# Patient Record
Sex: FEMALE | Race: WHITE | NOT HISPANIC OR LATINO | URBAN - METROPOLITAN AREA
[De-identification: names, ages, dates, MRNs, and addresses within clinical notes are randomized per-mention and may not be internally consistent; named-entity substitution may affect disease eponyms.]

---

## 2017-08-11 ENCOUNTER — IMPORTED ENCOUNTER (OUTPATIENT)
Dept: URBAN - METROPOLITAN AREA CLINIC 50 | Facility: CLINIC | Age: 67
End: 2017-08-11

## 2017-08-14 ENCOUNTER — IMPORTED ENCOUNTER (OUTPATIENT)
Dept: URBAN - METROPOLITAN AREA CLINIC 50 | Facility: CLINIC | Age: 67
End: 2017-08-14

## 2018-06-11 ENCOUNTER — IMPORTED ENCOUNTER (OUTPATIENT)
Dept: URBAN - METROPOLITAN AREA CLINIC 50 | Facility: CLINIC | Age: 68
End: 2018-06-11

## 2018-08-24 ENCOUNTER — IMPORTED ENCOUNTER (OUTPATIENT)
Dept: URBAN - METROPOLITAN AREA CLINIC 50 | Facility: CLINIC | Age: 68
End: 2018-08-24

## 2019-08-22 ENCOUNTER — IMPORTED ENCOUNTER (OUTPATIENT)
Dept: URBAN - METROPOLITAN AREA CLINIC 50 | Facility: CLINIC | Age: 69
End: 2019-08-22

## 2020-08-27 ENCOUNTER — IMPORTED ENCOUNTER (OUTPATIENT)
Dept: URBAN - METROPOLITAN AREA CLINIC 50 | Facility: CLINIC | Age: 70
End: 2020-08-27

## 2020-08-27 NOTE — PATIENT DISCUSSION
"""Sympomatic PVD both eyes. Extended ophthalmoscopy performed today. No evidence of retinal tears seen on [facility] 3-mirror exam. RD precautions discussed. Patient instructed to call if vision decreases or RD warning signs increase/worsen.  """

## 2021-04-18 ASSESSMENT — VISUAL ACUITY
OS_CC: 20/25
OS_CC: J1+@ 17 IN
OD_CC: J1+
OS_CC: 20/25
OS_CC: 20/25
OD_CC: J1+@ 17 IN
OD_CC: 20/25
OD_CC: J1+
OS_CC: J1+
OD_CC: 20/25-1
OD_CC: 20/20-
OS_CC: 20/20-2
OS_CC: J1+
OD_CC: 20/20-1

## 2021-04-18 ASSESSMENT — TONOMETRY
OS_IOP_MMHG: 12
OD_IOP_MMHG: 11
OS_IOP_MMHG: 11
OS_IOP_MMHG: 12
OD_IOP_MMHG: 12
OD_IOP_MMHG: 11
OS_IOP_MMHG: 11
OD_IOP_MMHG: 12

## 2021-09-13 ENCOUNTER — PREPPED CHART (OUTPATIENT)
Dept: URBAN - METROPOLITAN AREA CLINIC 49 | Facility: CLINIC | Age: 71
End: 2021-09-13

## 2021-09-17 ENCOUNTER — ANNUAL COMPREHENSIVE EXAM (OUTPATIENT)
Dept: URBAN - METROPOLITAN AREA CLINIC 49 | Facility: CLINIC | Age: 71
End: 2021-09-17

## 2021-09-17 DIAGNOSIS — E11.9: ICD-10-CM

## 2021-09-17 DIAGNOSIS — H35.3111: ICD-10-CM

## 2021-09-17 DIAGNOSIS — Z79.899: ICD-10-CM

## 2021-09-17 DIAGNOSIS — H52.13: ICD-10-CM

## 2021-09-17 PROCEDURE — 92015 DETERMINE REFRACTIVE STATE: CPT

## 2021-09-17 PROCEDURE — 92014 COMPRE OPH EXAM EST PT 1/>: CPT

## 2021-09-17 PROCEDURE — 92134 CPTRZ OPH DX IMG PST SGM RTA: CPT

## 2021-09-17 ASSESSMENT — VISUAL ACUITY
OS_PH: 20/30
OS_CC: 20/40-
OD_CC: 20/40-
OU_CC: J1
OD_PH: 20/25

## 2021-09-17 ASSESSMENT — TONOMETRY
OS_IOP_MMHG: 14
OD_IOP_MMHG: 14

## 2023-01-24 ENCOUNTER — COMPREHENSIVE EXAM (OUTPATIENT)
Dept: URBAN - METROPOLITAN AREA CLINIC 49 | Facility: CLINIC | Age: 73
End: 2023-01-24

## 2023-01-24 DIAGNOSIS — Z79.899: ICD-10-CM

## 2023-01-24 DIAGNOSIS — E11.9: ICD-10-CM

## 2023-01-24 PROCEDURE — 92082 INTERMEDIATE VISUAL FIELD XM: CPT

## 2023-01-24 PROCEDURE — 92014 COMPRE OPH EXAM EST PT 1/>: CPT

## 2023-01-24 PROCEDURE — 92134 CPTRZ OPH DX IMG PST SGM RTA: CPT

## 2023-01-24 ASSESSMENT — VISUAL ACUITY
OD_CC: 20/25
OU_CC: 20/25+2
OU_CC: J1 @ 16IN
OS_CC: 20/25-1

## 2023-01-24 ASSESSMENT — TONOMETRY
OS_IOP_MMHG: 15
OD_IOP_MMHG: 13

## 2023-01-24 NOTE — PATIENT DISCUSSION
Stable from last appointment, assured patient that it is very minor beginning stages of AMD. Will continue to monitor yearly.

## 2023-01-24 NOTE — PATIENT DISCUSSION
Discussed with patient no evidence of retinal toxicity noted. Macular OCT within normal limits. VF findings not reliable, will repeat at another visit to reassess. Will call patient to let her know the results. Will continue to monitor yearly and will send report to managing physician.

## 2024-10-23 ENCOUNTER — COMPREHENSIVE EXAM (OUTPATIENT)
Dept: URBAN - METROPOLITAN AREA CLINIC 53 | Facility: CLINIC | Age: 74
End: 2024-10-23

## 2024-10-23 DIAGNOSIS — Z79.899: ICD-10-CM

## 2024-10-23 DIAGNOSIS — H52.13: ICD-10-CM

## 2024-10-23 DIAGNOSIS — H35.3111: ICD-10-CM

## 2024-10-23 DIAGNOSIS — E11.9: ICD-10-CM

## 2024-10-23 DIAGNOSIS — H35.371: ICD-10-CM

## 2024-10-23 PROCEDURE — 92134 CPTRZ OPH DX IMG PST SGM RTA: CPT

## 2024-10-23 PROCEDURE — 92015 DETERMINE REFRACTIVE STATE: CPT

## 2024-10-23 PROCEDURE — 99214 OFFICE O/P EST MOD 30 MIN: CPT
